# Patient Record
Sex: MALE | Race: WHITE | NOT HISPANIC OR LATINO | Employment: PART TIME | URBAN - METROPOLITAN AREA
[De-identification: names, ages, dates, MRNs, and addresses within clinical notes are randomized per-mention and may not be internally consistent; named-entity substitution may affect disease eponyms.]

---
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
## 2025-01-27 DIAGNOSIS — E66.01 SEVERE OBESITY (H): ICD-10-CM

## 2025-01-27 RX ORDER — PHENTERMINE HYDROCHLORIDE 30 MG/1
30 CAPSULE ORAL EVERY MORNING
Qty: 90 CAPSULE | Refills: 1 | Status: SHIPPED | OUTPATIENT
Start: 2025-01-27

## 2025-01-27 NOTE — TELEPHONE ENCOUNTER
Faxed refill request received from: [facility]    Pending Prescriptions:                       Disp   Refills    phentermine 30 MG capsule                 90 cap*1            Sig: Take 1 capsule (30 mg) by mouth every morning.     Last Office Visit: 8/26/24  Next Appointment Scheduled for: 2/3/25  Last refill: 9/16/2024    NUNO Rao

## 2025-02-03 ENCOUNTER — TELEPHONE (OUTPATIENT)
Dept: PEDIATRICS | Facility: CLINIC | Age: 20
End: 2025-02-03

## 2025-02-03 ENCOUNTER — OFFICE VISIT (OUTPATIENT)
Dept: PEDIATRICS | Facility: CLINIC | Age: 20
End: 2025-02-03
Payer: COMMERCIAL

## 2025-02-03 VITALS
BODY MASS INDEX: 39.71 KG/M2 | HEIGHT: 69 IN | DIASTOLIC BLOOD PRESSURE: 88 MMHG | HEART RATE: 80 BPM | SYSTOLIC BLOOD PRESSURE: 126 MMHG | WEIGHT: 268.08 LBS

## 2025-02-03 DIAGNOSIS — K76.0 NAFLD (NONALCOHOLIC FATTY LIVER DISEASE): ICD-10-CM

## 2025-02-03 DIAGNOSIS — E78.5 DYSLIPIDEMIA: ICD-10-CM

## 2025-02-03 DIAGNOSIS — E66.01 SEVERE OBESITY (H): Primary | ICD-10-CM

## 2025-02-03 NOTE — NURSING NOTE
"Joe Jones's goals for this visit include:   Chief Complaint   Patient presents with    Follow Up       He requests these members of his care team be copied on today's visit information:     PCP: No Ref-Primary, Physician    Referring Provider:  Referred Self, MD  No address on file    /88 (BP Location: Left arm, Patient Position: Sitting, Cuff Size: Adult Large)   Pulse 80   Ht 1.761 m (5' 9.33\")   Wt 121.6 kg (268 lb 1.3 oz)   BMI 39.21 kg/m      Do you need any medication refills at today's visit? No  Francoise Fuentes CMA      "

## 2025-02-03 NOTE — TELEPHONE ENCOUNTER
Prior Authorization Approval    Medication: WEGOVY 0.25 MG/0.5ML SC SOAJ  Authorization Effective Date:    Authorization Expiration Date:    Approved Dose/Quantity: 2 per 28  Reference #: [id number]   Insurance Company: [employer] - Phone [phone] Fax [phone]  Expected CoPay: $    CoPay Card Available:      Financial Assistance Needed: NA  Which Pharmacy is filling the prescription:    Pharmacy Notified: Yes  Patient Notified: no- waiting on approval letter

## 2025-02-03 NOTE — PROGRESS NOTES
Date: 2025    PATIENT:  Joe Jones  :          2005  HANSEL:          Feb 3, 2025    To whom it may concern:    I had the pleasure of seeing your patient, Joe Jones, for a follow-up visit in the [facility] on Feb 3, 2025 at the [facility] in [place]. Please see below for my assessment and plan of care.    As you may recall, Joe is a 19 year old young man with otherwise normal development who presents with a BMI in the class 3 severe obesity category complicated by presumptive NAFLD, vit C and vit D deficiency and snoring.     Joe was last seen in this clinic 5 mos ago.  Joe was accompanied to today's appointment by mom.         Intercurrent History:    Wt increased 17 lbs over past 5 mos.  Did not take topiramate/phentermine for 1-2 mos bc he forgot then got out of the habit.    Working 8pm to midnight at [employer] - getting paid to workout.  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     Eating:  unchanged    Physical Activity:  Weight lifting at work     Anti-Obesity Medications/Medication Changes:  Phentermine 30mg qam  Topamax 125 mg qam   No side effects     Social, Family, Medical Hx:  Graduated from [employer]   Working at [employer] and still working at [employer].   Started school/college in  - web development  Mat Aunt had papillary thyroid cancer    ROS:  Sleep - not reviewed  Mood- good.    Current Medications:  Current Outpatient Medications   Medication Sig Dispense Refill    Cholecalciferol (VITAMIN D) 50 MCG ( UT) CAPS Take 2,000 Units by mouth daily (Patient not taking: Reported on 2024) 90 capsule 1    phentermine 30 MG capsule Take 1 capsule (30 mg) by mouth every morning. 90 capsule 1    topiramate (TOPAMAX) 100 MG tablet Take 1 tablet (100 mg) by mouth daily. 90 tablet 0    topiramate (TOPAMAX) 25 MG tablet Take 1 tablet (25 mg) by mouth daily. 90 tablet 0     No current facility-administered medications  "for this visit.       Physical Exam:  Vitals:  /88 (BP Location: Left arm, Patient Position: Sitting, Cuff Size: Adult Large)   Pulse 80   Ht 1.761 m (5' 9.33\")   Wt 121.6 kg (268 lb 1.3 oz)   BMI 39.21 kg/m      Weight:  Wt Readings from Last 4 Encounters:   02/03/25 121.6 kg (268 lb 1.3 oz) (>99%, Z= 2.68)*   08/26/24 114.1 kg (251 lb 8.7 oz) (>99%, Z= 2.44)*   05/13/24 119.1 kg (262 lb 9.1 oz) (>99%, Z= 2.60)*   12/18/23 107.8 kg (237 lb 10.5 oz) (99%, Z= 2.25)*     * Growth percentiles are based on CDC (Boys, 2-20 Years) data.     Height:    Ht Readings from Last 4 Encounters:   02/03/25 1.761 m (5' 9.33\") (46%, Z= -0.09)*   08/26/24 1.755 m (5' 9.09\") (44%, Z= -0.16)*   05/13/24 1.756 m (5' 9.13\") (45%, Z= -0.13)*   12/18/23 1.767 m (5' 9.57\") (52%, Z= 0.04)*     * Growth percentiles are based on CDC (Boys, 2-20 Years) data.     Body Mass Index:    BMI Readings from Last 4 Encounters:   02/03/25 39.21 kg/m  (99%, Z= 2.32)*   08/26/24 37.05 kg/m  (98%, Z= 2.17)*   05/13/24 38.62 kg/m  (>99%, Z= 2.34)*   12/18/23 34.53 kg/m  (98%, Z= 2.01)*     * Growth percentiles are based on CDC (Boys, 2-20 Years) data.      Body Mass Index Percentile:  99 %ile (Z= 2.32) based on CDC (Boys, 2-20 Years) BMI-for-age based on BMI available on 2/3/2025.     Labs:       Latest Reference Range & Units 06/05/23 09:23 05/13/24 11:26   Sodium 135 - 145 mmol/L 140 142   Potassium 3.4 - 5.3 mmol/L 4.0    Potassium 3.4 - 5.3 mmol/L  4.2   Chloride 98 - 110 mmol/L 110    Chloride 98 - 107 mmol/L  106   Carbon Dioxide 20 - 32 mmol/L 27    Carbon Dioxide (CO2) 22 - 29 mmol/L  28   Urea Nitrogen 7 - 21 mg/dL 13    Urea Nitrogen 6.0 - 20.0 mg/dL  9.1   Creatinine 0.67 - 1.17 mg/dL 0.83 0.81   GFR Estimate >60 mL/min/1.73m2 See Comment >90   Calcium 8.6 - 10.0 mg/dL 9.2 9.3   Anion Gap 7 - 15 mmol/L 3 8   ALT 0 - 50 U/L 22 16   AST 0 - 35 U/L 17 20   Cholesterol <170 mg/dL 151 164   Patient Fasting?  Yes    Glucose 70 - 99 mg/dL  91 "   HDL Cholesterol >=45 mg/dL 46 45   Hemoglobin A1C 0.0 - 5.6 % 5.0 5.1   LDL Cholesterol Calculated <=110 mg/dL 89 102   Non HDL Cholesterol <120 mg/dL 105 119   Triglycerides <=90 mg/dL 79 87   Vitamin D Deficiency screening 20 - 75 ug/L 32          Assessment:  Joe is a 19 year old male with no significant pmhx with class 3 obesity (defined as a BMI >/ 140% of the 95th percentile) complicated by elevated ALT (presumed MASLD), mild hypertriglyceridemia, hx of vitamin C and vitamin D deficiency.   Wt increased 17 lbs over past 5 mos.  He acknowledges that he has not taking phentermine/topiramate daily - missing about 1-2 mos.  He is interested in trying Wegovy which is dosed only weekly and on average is more effective that phentermine/topiramate.  Side effects and contraindications were reviewed.     Joe s current problem list reviewed today includes:    Encounter Diagnoses   Name Primary?    Severe obesity (H) Yes    Dyslipidemia     NAFLD (nonalcoholic fatty liver disease)        Care Plan:  Severe Obesity:   - Continue lifestyle modification therapy - focus on reducing liquid cals  - discontinue phentermine and topiramate  - send PA to Start Wegovy 0.25mg subcutaneous weekly x 4, then 0.5mg weekly x 4, then 1mg weekly x 4, then 1.7mg weekly.       Elevated ALT - Likely related to NAFLD (improved)  -Wt loss as noted above     Hypertriglyceridemia (improved):   -Weight loss as noted above    Elevated BP  -likely white coat HTN, plan to monitor for now - better today    40 min spent on the date of the encounter in chart review, patient visit, review of tests, documentation and/or discussion with other providers about the issues documented above.       We are looking forward to seeing Joe for a follow-up visit in 4 months.  After that we will transition him to a primary care doctor for continued obesity treatment.    Thank you for including me in the care of your patient.  Please do not hesitate to call with  questions or concerns.        Joi Arshad MD    of Pediatrics  Diplomate American Board of Obesity Medicine    [facility] [phone]  [facility] [phone]  [facility], [facility] [phone]  [facility] [place] [phone]

## 2025-02-03 NOTE — PATIENT INSTRUCTIONS
WEGOVY (semaglutide)  What is Wegovy?  Wegovy (semaglutide) is an injectable prescription medicine FDA-approved for use in adults and adolescents aged 12 and older with obesity (BMI >=30) or overweight (BMI >=27) who also have weight-related medical problems. Wegovy helps them lose weight and maintain weight loss.  Wegovy works by mimicking a hormone that targets areas of the brain involved in regulating appetite and food intake. It also slows down digestion, so food moves more slowly through the digestive tract, helping you feel peck longer and eat less, which can lead to weight loss. Wegovy should be used in conjunction with a reduced-calorie meal plan and increased physical activity.  How to Start Wegovy  Dosage Schedule:  Start with 0.25 mg once weekly for 4 weeks.  If tolerated, increase to 0.5 mg once weekly for 4 weeks.  If tolerated, increase to 1 mg once weekly for 4 weeks.  If tolerated, increase to 1.7 mg once weekly.  Discuss with your doctor if increasing the dose to 2.4 mg once weekly is right for you.  Using Wegovy Pens:  Each box of Wegovy contains 4 pens of the same dose.  Each pen is a single-dose, prefilled pen with a built-in injector for once-weekly use.  Discard the Wegovy pen after use in a sharps container.  Storage:  Store Wegovy in the refrigerator until ready to use.  Once ready to use, the pen can be kept at room temperature for up to 28 days.  Potential Common Side Effects  Upset stomach  Nausea  Vomiting  Headache  Diarrhea  Constipation  If these side effects become unmanageable or concerning, please contact your care team via [employer] or phone.  Tips to Minimize Side Effects  Eat slowly.  Eat smaller, more frequent meals.  Avoid fatty foods.  Additional Information  Visit [email or web address].com to learn more and watch instructional videos.  Contact Information  For questions or concerns, send a [employer] message to our team or call our nurse coordinator at [phone] during regular business  hours.  For questions during evenings or weekends, your messages will be addressed on the next business day.  For emergencies, please call 911 or seek immediate medical care.  [facility] Phone Number: [phone]       How to Limit and Manage Side Effects from GLP1's                        Steven MARTINEZ, Adan P, Richie MARCH, Serg A, Ella A, donell Salguero A, Joanne HEREDIA, Ashley MARCH, Shanon LENTZ, Karlie DODD, Vannessa BERMEO, Remington PARADA. Clinical Recommendations to Manage Gastrointestinal Adverse Events in Patients Treated with Glp-1 Receptor Agonists: A Multidisciplinary Expert Consensus. J Clin Med. 2022 Dec 24;12(1):145.           Thank you for choosing [facility]. It was a pleasure to see you for your office visit today.     If you have any questions or scheduling needs during regular office hours, please call: [phone]  If urgent concerns arise after hours, you can call [phone] and ask to speak to the pediatric specialist on call.   If you need to schedule Imaging/Radiology tests, please call: [phone]  [employer] messages are for routine communication and questions and are usually answered within 48-72 hours. If you have an urgent concern or require sooner response, please call us.  Outside lab and imaging results should be faxed to [phone].  If you go to a lab outside of [facility] we will not automatically get those results. You will need to ask to have them faxed.   You may receive a survey regarding your experience with the clinic today. We would appreciate your feedback.   We encourage to you make your follow-up today to ensure a timely appointment. If you are unable to do so please reach out to [phone] as soon as possible.       If you had any blood work, imaging or other tests completed today:  Normal test results will be mailed to your home address in a letter.  Abnormal results will be communicated to you  via phone call/letter.  Please allow up to 1-2 weeks for processing and interpretation of most lab work.

## 2025-02-07 NOTE — TELEPHONE ENCOUNTER
Called and spoke to mom.  Let mom know that Wegovy was approved by insurance and they should be able to  prescription at their pharmacy.  Mom reports they already picked up medication, but have not started it yet.  Mom had no questions or concerns about Wegovy.  Encouraged her to call back with any questions or concerns once Joe starts medication.

## 2025-02-20 DIAGNOSIS — E66.01 SEVERE OBESITY (H): ICD-10-CM

## 2025-02-20 NOTE — TELEPHONE ENCOUNTER
Called and spoke with mother. Mother reports that they do not need the Wegovy 0.25 mg. They have both the 0.25 mg and 0.5 mg at home. Mother will call with questions or concerns.   Gwendolyn Dias RN

## 2025-02-20 NOTE — TELEPHONE ENCOUNTER
Faxed refill request received from: [facility]  Pending Prescriptions:                       Disp   Refills    semaglutide-weight management (WEGOVY) 0.*2 mL   0            Sig: Inject 0.5 mLs (0.25 mg) subcutaneously once a           week.      Last Office Visit: 02/03/2025  Next Appointment Scheduled for: 06/30/2025  Last refill: 02/04/2025       Orlando Ansari CMA on 2/20/2025 at 9:12 AM

## 2025-04-08 DIAGNOSIS — E66.01 SEVERE OBESITY (H): Primary | ICD-10-CM

## 2025-04-08 NOTE — TELEPHONE ENCOUNTER
[facility] Call Center    Phone Message    May a detailed message be left on voicemail: yes     Reason for Call: Medication Refill Request    Has the patient contacted the pharmacy for the refill? Yes   Name of medication being requested: Wegovy 1.7    Provider who prescribed the medication: Dr Arshad    Pharmacy: [facility], [place]    Date medication is needed: Patient is doing well on the lower dose and is ready to go up to the 1.7         Action Taken: Other: Peds weight management    Travel Screening: Not Applicable     Date of Service:

## 2025-06-30 ENCOUNTER — OFFICE VISIT (OUTPATIENT)
Dept: PEDIATRICS | Facility: CLINIC | Age: 20
End: 2025-06-30
Payer: COMMERCIAL

## 2025-06-30 VITALS
SYSTOLIC BLOOD PRESSURE: 124 MMHG | WEIGHT: 268.3 LBS | HEIGHT: 70 IN | HEART RATE: 80 BPM | DIASTOLIC BLOOD PRESSURE: 81 MMHG | BODY MASS INDEX: 38.41 KG/M2

## 2025-06-30 DIAGNOSIS — K76.0 NAFLD (NONALCOHOLIC FATTY LIVER DISEASE): ICD-10-CM

## 2025-06-30 DIAGNOSIS — E78.5 DYSLIPIDEMIA: ICD-10-CM

## 2025-06-30 DIAGNOSIS — E66.813 CLASS 3 OBESITY (H): Primary | ICD-10-CM

## 2025-06-30 NOTE — PATIENT INSTRUCTIONS
Increase Wegovy to 2.4 mg weekly.  Increase protein to 60 gm per day:  add eggs to breakfast, try Casey Hansen, cauliflower rice burrito bowl, protein shakes    Thank you for choosing [facility]. It was a pleasure to see you for your office visit today.     If you have any questions or scheduling needs during regular office hours, please call: [phone]  If urgent concerns arise after hours, you can call [phone] and ask to speak to the pediatric specialist on call.   If you need to schedule Imaging/Radiology tests, please call: [phone]  [facility] messages are for routine communication and questions and are usually answered within 48-72 hours. If you have an urgent concern or require sooner response, please call us.  Outside lab and imaging results should be faxed to [phone].  If you go to a lab outside of [facility] we will not automatically get those results. You will need to ask to have them faxed.   You may receive a survey regarding your experience with the clinic today. We would appreciate your feedback.   We encourage to you make your follow-up today to ensure a timely appointment. If you are unable to do so please reach out to [phone] as soon as possible.       If you had any blood work, imaging or other tests completed today:  Normal test results will be mailed to your home address in a letter.  Abnormal results will be communicated to you via phone call/letter.  Please allow up to 1-2 weeks for processing and interpretation of most lab work.

## 2025-06-30 NOTE — PROGRESS NOTES
Date: 2025    PATIENT:  Joe Jones  :          2005  HANSEL:          2025    To whom it may concern:    I had the pleasure of seeing your patient, Joe Jones, for a follow-up visit in the [facility] on 2025 at the [facility] in [place]. Please see below for my assessment and plan of care.    As you may recall, Joe is a 19 year old young man with otherwise normal development who presents with a BMI in the class 3 severe obesity category complicated by presumptive NAFLD, vit C and vit D deficiency and snoring.     Joe was last seen in this clinic nearly 5 mos ago.  Joe was accompanied to today's appointment by mom.         Intercurrent History:    At our last visit in Feb, we changed phen/topiramate to Wegovy.  Taking Wegovy 1.7 mg weekly. No GI SE.   Appetite is lower but no change in wt.    Working 8pm to midnight at [employer] - getting paid to workout.  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     Eating:  Eating sandwich or ramen at midnight and goes to bed and is up around 1-2 pm.  Eats a bagel for BF.  At work, eats 2 sandwich  + chips.    Drinking water.    Eating out 3 times per week.    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    Current Medications:  Current Outpatient Medications   Medication Sig Dispense Refill    phentermine 30 MG capsule Take 1 capsule (30 mg) by mouth every morning. 90 capsule 1    Semaglutide-Weight Management (WEGOVY) 1.7 MG/0.75ML pen Inject 1.7 mg subcutaneously once a week. 3 mL 2    topiramate (TOPAMAX) 100 MG  "tablet Take 1 tablet (100 mg) by mouth daily. 90 tablet 0    topiramate (TOPAMAX) 25 MG tablet Take 1 tablet (25 mg) by mouth daily. 90 tablet 0    Cholecalciferol (VITAMIN D) 50 MCG (2000 UT) CAPS Take 2,000 Units by mouth daily (Patient not taking: Reported on 2/3/2025) 90 capsule 1     No current facility-administered medications for this visit.       Physical Exam:  Vitals:  /81   Pulse 80   Ht 1.766 m (5' 9.53\")   Wt 121.7 kg (268 lb 4.8 oz)   BMI 39.02 kg/m      Weight:  Wt Readings from Last 4 Encounters:   06/30/25 121.7 kg (268 lb 4.8 oz) (>99%, Z= 2.66)*   02/03/25 121.6 kg (268 lb 1.3 oz) (>99%, Z= 2.68)*   08/26/24 114.1 kg (251 lb 8.7 oz) (>99%, Z= 2.44)*   05/13/24 119.1 kg (262 lb 9.1 oz) (>99%, Z= 2.60)*     * Growth percentiles are based on CDC (Boys, 2-20 Years) data.     Height:    Ht Readings from Last 4 Encounters:   06/30/25 1.766 m (5' 9.53\") (49%, Z= -0.03)*   02/03/25 1.761 m (5' 9.33\") (46%, Z= -0.09)*   08/26/24 1.755 m (5' 9.09\") (44%, Z= -0.16)*   05/13/24 1.756 m (5' 9.13\") (45%, Z= -0.13)*     * Growth percentiles are based on CDC (Boys, 2-20 Years) data.     Body Mass Index:    BMI Readings from Last 4 Encounters:   06/30/25 39.02 kg/m  (99%, Z= 2.26, 128% of 95%ile)*   02/03/25 39.21 kg/m  (99%, Z= 2.32, 130% of 95%ile)*   08/26/24 37.05 kg/m  (98%, Z= 2.17, 124% of 95%ile)*   05/13/24 38.62 kg/m  (>99%, Z= 2.34, 131% of 95%ile)*     * Growth percentiles are based on CDC (Boys, 2-20 Years) data.      Body Mass Index Percentile:  99 %ile (Z= 2.26, 128% of 95%ile) based on CDC (Boys, 2-20 Years) BMI-for-age based on BMI available on 6/30/2025.     Labs:       Latest Reference Range & Units 06/05/23 09:23 05/13/24 11:26   Sodium 135 - 145 mmol/L 140 142   Potassium 3.4 - 5.3 mmol/L 4.0    Potassium 3.4 - 5.3 mmol/L  4.2   Chloride 98 - 110 mmol/L 110    Chloride 98 - 107 mmol/L  106   Carbon Dioxide 20 - 32 mmol/L 27    Carbon Dioxide (CO2) 22 - 29 mmol/L  28   Urea Nitrogen 7 " - 21 mg/dL 13    Urea Nitrogen 6.0 - 20.0 mg/dL  9.1   Creatinine 0.67 - 1.17 mg/dL 0.83 0.81   GFR Estimate >60 mL/min/1.73m2 See Comment >90   Calcium 8.6 - 10.0 mg/dL 9.2 9.3   Anion Gap 7 - 15 mmol/L 3 8   ALT 0 - 50 U/L 22 16   AST 0 - 35 U/L 17 20   Cholesterol <170 mg/dL 151 164   Patient Fasting?  Yes    Glucose 70 - 99 mg/dL  91   HDL Cholesterol >=45 mg/dL 46 45   Hemoglobin A1C 0.0 - 5.6 % 5.0 5.1   LDL Cholesterol Calculated <=110 mg/dL 89 102   Non HDL Cholesterol <120 mg/dL 105 119   Triglycerides <=90 mg/dL 79 87   Vitamin D Deficiency screening 20 - 75 ug/L 32          Assessment:  Joe is a 19 year old male with no significant pmhx with class 3 obesity complicated by elevated ALT (presumed MASLD), mild hypertriglyceridemia, hx of vitamin C and vitamin D deficiency.  Over past 5 mos, wt has been stable with lifestyle therapy supported by Wegovy 1.7 mg.  Plan to increase dose.      Joe s current problem list reviewed today includes:    Encounter Diagnoses   Name Primary?    Class 3 obesity (H) Yes    Dyslipidemia     NAFLD (nonalcoholic fatty liver disease)          Care Plan:  Severe Obesity:  Class 3  - Continue lifestyle modification therapy - focus on increasing protein at each meal  - increase Wegovy from 1.7 mg to 2.4 mg weekly.         Elevated ALT - Likely related to NAFLD (improved)  -Wt loss as noted above     Hypertriglyceridemia (improved):   -Weight loss as noted above    Elevated BP  -likely white coat HTN, plan to monitor for now - better today    Plan to transfer care to his PCP.  I will refill his Wegovy until he can meet with PCP.    Thank you for including me in the care of your patient.  Please do not hesitate to call with questions or concerns.    40 min spent on the date of the encounter in chart review, patient visit, review of tests, documentation and/or discussion with other providers about the issues documented above.     Joi Arshad MD    of  Pediatrics  Diplomate American Board of Obesity Medicine    [facility] [phone]  [facility] [phone]  [facility], [facility] [phone]  [facility] [place] [phone]